# Patient Record
Sex: MALE | Race: WHITE
[De-identification: names, ages, dates, MRNs, and addresses within clinical notes are randomized per-mention and may not be internally consistent; named-entity substitution may affect disease eponyms.]

---

## 2019-12-19 ENCOUNTER — HOSPITAL ENCOUNTER (OUTPATIENT)
Dept: HOSPITAL 75 - CARD | Age: 24
End: 2019-12-19
Attending: NURSE PRACTITIONER
Payer: COMMERCIAL

## 2019-12-19 DIAGNOSIS — R00.1: Primary | ICD-10-CM

## 2019-12-19 DIAGNOSIS — R42: ICD-10-CM

## 2019-12-19 LAB
ALBUMIN SERPL-MCNC: 4.9 GM/DL (ref 3.2–4.5)
ALP SERPL-CCNC: 58 U/L (ref 40–136)
ALT SERPL-CCNC: 17 U/L (ref 0–55)
BASOPHILS # BLD AUTO: 0 10^3/UL (ref 0–0.1)
BASOPHILS NFR BLD AUTO: 0 % (ref 0–10)
BILIRUB SERPL-MCNC: 0.9 MG/DL (ref 0.1–1)
BUN/CREAT SERPL: 18
CALCIUM SERPL-MCNC: 9.5 MG/DL (ref 8.5–10.1)
CHLORIDE SERPL-SCNC: 104 MMOL/L (ref 98–107)
CHOLEST SERPL-MCNC: 149 MG/DL (ref ?–200)
CO2 SERPL-SCNC: 29 MMOL/L (ref 21–32)
CREAT SERPL-MCNC: 0.88 MG/DL (ref 0.6–1.3)
EOSINOPHIL # BLD AUTO: 0.2 10^3/UL (ref 0–0.3)
EOSINOPHIL NFR BLD AUTO: 3 % (ref 0–10)
ERYTHROCYTE [DISTWIDTH] IN BLOOD BY AUTOMATED COUNT: 11.8 % (ref 10–14.5)
GFR SERPLBLD BASED ON 1.73 SQ M-ARVRAT: > 60 ML/MIN
GLUCOSE SERPL-MCNC: 90 MG/DL (ref 70–105)
HCT VFR BLD CALC: 42 % (ref 40–54)
HDLC SERPL-MCNC: 34 MG/DL (ref 40–60)
HGB BLD-MCNC: 15.3 G/DL (ref 13.3–17.7)
LYMPHOCYTES # BLD AUTO: 2.3 X 10^3 (ref 1–4)
LYMPHOCYTES NFR BLD AUTO: 30 % (ref 12–44)
MANUAL DIFFERENTIAL PERFORMED BLD QL: NO
MCH RBC QN AUTO: 30 PG (ref 25–34)
MCHC RBC AUTO-ENTMCNC: 36 G/DL (ref 32–36)
MCV RBC AUTO: 83 FL (ref 80–99)
MONOCYTES # BLD AUTO: 0.6 X 10^3 (ref 0–1)
MONOCYTES NFR BLD AUTO: 8 % (ref 0–12)
NEUTROPHILS # BLD AUTO: 4.5 X 10^3 (ref 1.8–7.8)
NEUTROPHILS NFR BLD AUTO: 60 % (ref 42–75)
PLATELET # BLD: 276 10^3/UL (ref 130–400)
PMV BLD AUTO: 9.2 FL (ref 7.4–10.4)
POTASSIUM SERPL-SCNC: 4 MMOL/L (ref 3.6–5)
PROT SERPL-MCNC: 7 GM/DL (ref 6.4–8.2)
SODIUM SERPL-SCNC: 142 MMOL/L (ref 135–145)
T4 FREE SERPL-MCNC: 0.92 NG/DL (ref 0.7–1.48)
TRIGL SERPL-MCNC: 83 MG/DL (ref ?–150)
VLDLC SERPL CALC-MCNC: 17 MG/DL (ref 5–40)
WBC # BLD AUTO: 7.6 10^3/UL (ref 4.3–11)

## 2019-12-19 PROCEDURE — 84439 ASSAY OF FREE THYROXINE: CPT

## 2019-12-19 PROCEDURE — 84443 ASSAY THYROID STIM HORMONE: CPT

## 2019-12-19 PROCEDURE — 80061 LIPID PANEL: CPT

## 2019-12-19 PROCEDURE — 93005 ELECTROCARDIOGRAM TRACING: CPT

## 2019-12-19 PROCEDURE — 80053 COMPREHEN METABOLIC PANEL: CPT

## 2019-12-19 PROCEDURE — 36415 COLL VENOUS BLD VENIPUNCTURE: CPT

## 2019-12-19 PROCEDURE — 85025 COMPLETE CBC W/AUTO DIFF WBC: CPT

## 2020-01-10 ENCOUNTER — HOSPITAL ENCOUNTER (OUTPATIENT)
Dept: HOSPITAL 75 - CARD | Age: 25
End: 2020-01-10
Attending: NURSE PRACTITIONER
Payer: COMMERCIAL

## 2020-01-10 DIAGNOSIS — R00.1: Primary | ICD-10-CM

## 2020-01-10 PROCEDURE — 93306 TTE W/DOPPLER COMPLETE: CPT

## 2020-02-03 ENCOUNTER — HOSPITAL ENCOUNTER (OUTPATIENT)
Dept: HOSPITAL 75 - CARD | Age: 25
LOS: 90 days | Discharge: HOME | End: 2020-05-03
Attending: INTERNAL MEDICINE
Payer: COMMERCIAL

## 2020-02-03 DIAGNOSIS — R42: ICD-10-CM

## 2020-02-03 DIAGNOSIS — I50.20: Primary | ICD-10-CM

## 2021-09-22 ENCOUNTER — HOSPITAL ENCOUNTER (EMERGENCY)
Dept: HOSPITAL 75 - ER | Age: 26
Discharge: HOME | End: 2021-09-22
Payer: COMMERCIAL

## 2021-09-22 VITALS — WEIGHT: 210.1 LBS | HEIGHT: 75.98 IN | BODY MASS INDEX: 25.58 KG/M2

## 2021-09-22 VITALS — SYSTOLIC BLOOD PRESSURE: 130 MMHG | DIASTOLIC BLOOD PRESSURE: 97 MMHG

## 2021-09-22 DIAGNOSIS — S61.451A: Primary | ICD-10-CM
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
## 2021-09-22 DIAGNOSIS — Z23: ICD-10-CM

## 2021-09-22 PROCEDURE — 90715 TDAP VACCINE 7 YRS/> IM: CPT

## 2021-09-22 PROCEDURE — 12002 RPR S/N/AX/GEN/TRNK2.6-7.5CM: CPT

## 2021-09-22 RX ADMIN — TETANUS TOXOID, REDUCED DIPHTHERIA TOXOID AND ACELLULAR PERTUSSIS VACCINE, ADSORBED ONE ML: 5; 2.5; 8; 8; 2.5 SUSPENSION INTRAMUSCULAR at 21:37

## 2021-09-22 RX ADMIN — AMOXICILLIN AND CLAVULANATE POTASSIUM ONE MG: 875; 125 TABLET, FILM COATED ORAL at 21:36

## 2021-09-22 NOTE — ED INTEGUMENTARY GENERAL
General


Chief Complaint:  Bite-Animal/Human/Insect


Stated Complaint:  DOG BITE


Nursing Triage Note:  


DOG BITE TO RIGHT HAND, BLEEDING CONTROLLED


Source:  patient


Exam Limitations:  no limitations


 (FANNIE RUBIO)





History of Present Illness


Date Seen by Provider:  Sep 22, 2021


Time Seen by Provider:  21:15


Initial Comments


To ER by mother with reports of a dog bite to the right hand.  This was his own 

dog, old and deaf and did not know he was in the house when he reached down to 

pet it and scared the dog.  He does not believe it is up-to-date on his vaccines

but this was a provoked attack and there is not much concern for rabies.  

Behavior has been normal.  Patient himself is not up-to-date on his tetanus 

vaccine.


Timing/Duration:  just prior to arrival, getting worse


Location:  none, hands


Associated Symptoms:  denies symptoms (FANNIE RUBIO)





Allergies and Home Medications


Allergies


Coded Allergies:  


     NKANo Known Allergies (Unverified  Allergy, Mild, 9/12/09)





Patient Home Medication List


Home Medication List Reviewed:  Yes


 (FANNIE RUBIO)


Amoxicillin/Potassium Clav (Augmentin 875-125 Tablet) 1 Each Tablet, 1 EACH PO 

BID


   Prescribed by: FANNIE RUBIO on 9/22/21 2149


Discontinued Medications


Diphenhydramine Hcl (Diphenhydramine 25 Mg) 25 Mg Tablet, 2 TAB PO PRN, 

(Reported)


   Discontinued Reason: No Longer Taking


   Entered as Reported by: DARIAN HUNTER on 4/10/12 1953


   Last Action: Discontinued


Hydrocodone Bit/Acetaminophen (Hydrocodone-Apap 5-325 Tablet) 1 Tab Tablet, 1 

TAB PO Q4H PRN for PAIN


   Discontinued Reason: No Longer Taking


   Prescribed by: REBEKAH BENNETT on 8/7/14 0141


   Last Action: Discontinued





Review of Systems


Review of Systems


Constitutional:  see HPI


EENTM:  see HPI


Respiratory:  no symptoms reported


Cardiovascular:  no symptoms reported


Genitourinary:  no symptoms reported


Musculoskeletal:  see HPI


Skin:  no symptoms reported


Psychiatric/Neurological:  No Symptoms Reported


Endocrine:  No Symptoms Reported (FANNIE RUBIO)





Past Medical-Social-Family Hx


Patient Social History


Tobacco Use?:  No


Use of E-Cig and/or Vaping dev:  No


Substance use?:  No


Alcohol Use?:  No


Pt feels they are or have been:  No


 (FANNIE RUBIO)





Immunizations Up To Date


Tetanus Booster (TDap):  Less than 5yrs


 (FANNIE RUBIO)





Past Medical History


Surgery/Hospitalization HX:  


UMBILICAL HERNIA


Asthma


Adverse Reaction/Blood Tranf:  No


 (FANNIE RUBIO)





Physical Exam


Vital Signs





Vital Signs - First Documented








 9/22/21





 21:18


 


Temp 36.6


 


Pulse 126


 


Resp 18


 


B/P (MAP) 130/97 (108)


 


Pulse Ox 97


 


O2 Delivery Room Air








 (LAURA DE ANDA MD)


Vital Signs


Capillary Refill : Less Than 3 Seconds 


 (FANNIE RUBIO)


General Appearance:  WD/WN, no apparent distress


HEENT:  PERRL/EOMI, normal ENT inspection


Neck:  non-tender, full range of motion


Respiratory:  normal breath sounds, no respiratory distress, no accessory muscle

use


Neurologic/Psychiatric:  alert, normal mood/affect, oriented x 3


Skin:  normal color, warm/dry


Skin Problem Location:  other


Skin Problem Character:  other (laceration x2 to right hand.  depth down to sub 

q tissues, not to muscle. No extensor tendon injury, extensor tendon function 

intact. )


 (FANNIE RUBIO)





Progress/Results/Core Measures


Results/Orders


Medications Given in ED





Current Medications








 Medications  Dose


 Ordered  Sig/Prashant


 Route  Start Time


 Stop Time Status Last Admin


Dose Admin


 


 Amoxicillin/


 Clavulanate


 Potassium  875 mg  ONCE  ONCE


 PO  9/22/21 21:30


 9/22/21 21:31 DC 9/22/21 21:36


875 MG


 


 Diphtheria/


 Tetanus/Acell


 Pertussis  0.5 ml  ONCE ONCE


 IM  9/22/21 21:30


 9/22/21 21:31 DC 9/22/21 21:37


0.5 ML





 (LAURA DE ANDA MD)


Vital Signs/I&O











 9/22/21





 21:18


 


Temp 36.6


 


Pulse 126


 


Resp 18


 


B/P (MAP) 130/97 (108)


 


Pulse Ox 97


 


O2 Delivery Room Air





 (LAURA DE ANDA MD)








Blood Pressure Mean:                    108











Departure


Communication (Admissions)


There is a laceration to the webspace between the middle and ring finger of the 

right hand and to the dorsal midline right hand.  There is no active bleeding.  

Because each of these lacerations were gaping by about half centimeter we 

decided to close them loosely.  The length of each laceration was 2 cm.  Each 1 

was anesthetized with 1.5 mL of lidocaine without epinephrine then scrubbed with

chlorhexidine/saline solution then irrigated with 120 mL of chlorhexidine/saline

solution.  No foreign bodies identified.  Closed with a total of 7 interrupted 

sutures size 4-0 Prolene.


 (FANNIE RUBIO)





Impression





   Primary Impression:  


   Hand laceration


   Additional Impression:  


   Dog bite


Disposition:  01 HOME, SELF-CARE


Condition:  Stable





Departure-Patient Inst.


Decision time for Depature:  21:48


 (FANNIE RUBIO)


Referrals:  


JOSIE ACOSTA DO (PCP/Family)


Primary Care Physician


Patient Instructions:  Laceration Repair With Stitches ED





Add. Discharge Instructions:  


Keep this covered with a dressing for the next 2 to 3 days to help collect any 

drainage.  After that you can leave it open to air.  You can shower letting 

water run over this starting tomorrow but do not soak it in water such as a 

bathtub hot tub or swimming pool until the stitches are removed.  Your mom can 

remove the stitches in about 7 to 10 days.  If there is any sign of infection 

then return to the emergency room.  Fill the antibiotics tomorrow.  Tylenol and 

ibuprofen for pain control.





All discharge instructions reviewed with patient and/or family. Voiced 

understanding.


Scripts


Amoxicillin/Potassium Clav (Augmentin 875-125 Tablet) 1 Each Tablet


1 EACH PO BID, #10 TAB 0 Refills


   Prov: FANNIE RUBIO         9/22/21








ATTENDING PHYSICIAN NOTE:


I was physically present as attending physician in the emergency department 

during the care of this patient, but I was not directly involved in the decision

making or delivery of care for this patient. 


 (LAURA DE ANDA MD)











FANNIE RUBIO             Sep 22, 2021 21:48


LAURA DE ANDA MD        Sep 23, 2021 08:43